# Patient Record
Sex: MALE | Race: WHITE | URBAN - METROPOLITAN AREA
[De-identification: names, ages, dates, MRNs, and addresses within clinical notes are randomized per-mention and may not be internally consistent; named-entity substitution may affect disease eponyms.]

---

## 2018-03-07 ENCOUNTER — OFFICE VISIT - HEALTHEAST (OUTPATIENT)
Dept: FAMILY MEDICINE | Facility: CLINIC | Age: 43
End: 2018-03-07

## 2018-03-07 DIAGNOSIS — I10 ESSENTIAL HYPERTENSION: ICD-10-CM

## 2018-03-07 DIAGNOSIS — R50.9 FEVER: ICD-10-CM

## 2018-03-07 DIAGNOSIS — R05.9 COUGH: ICD-10-CM

## 2018-03-07 LAB
FLUAV AG SPEC QL IA: ABNORMAL
FLUBV AG SPEC QL IA: ABNORMAL

## 2018-03-07 ASSESSMENT — MIFFLIN-ST. JEOR: SCORE: 1853.8

## 2019-02-18 ENCOUNTER — DOCUMENTATION ONLY (OUTPATIENT)
Dept: OTHER | Facility: CLINIC | Age: 44
End: 2019-02-18

## 2019-02-18 ENCOUNTER — AMBULATORY - HEALTHEAST (OUTPATIENT)
Dept: OTHER | Facility: CLINIC | Age: 44
End: 2019-02-18

## 2021-05-24 ENCOUNTER — RECORDS - HEALTHEAST (OUTPATIENT)
Dept: ADMINISTRATIVE | Facility: CLINIC | Age: 46
End: 2021-05-24

## 2021-05-27 ENCOUNTER — RECORDS - HEALTHEAST (OUTPATIENT)
Dept: ADMINISTRATIVE | Facility: CLINIC | Age: 46
End: 2021-05-27

## 2021-05-28 ENCOUNTER — RECORDS - HEALTHEAST (OUTPATIENT)
Dept: ADMINISTRATIVE | Facility: CLINIC | Age: 46
End: 2021-05-28

## 2021-05-30 ENCOUNTER — RECORDS - HEALTHEAST (OUTPATIENT)
Dept: ADMINISTRATIVE | Facility: CLINIC | Age: 46
End: 2021-05-30

## 2021-06-01 ENCOUNTER — RECORDS - HEALTHEAST (OUTPATIENT)
Dept: ADMINISTRATIVE | Facility: CLINIC | Age: 46
End: 2021-06-01

## 2021-06-01 VITALS — BODY MASS INDEX: 23.94 KG/M2 | HEIGHT: 75 IN | WEIGHT: 192.5 LBS

## 2021-06-02 ENCOUNTER — RECORDS - HEALTHEAST (OUTPATIENT)
Dept: ADMINISTRATIVE | Facility: CLINIC | Age: 46
End: 2021-06-02

## 2021-06-16 NOTE — PROGRESS NOTES
"MARGOT Landaverde is a 42 y.o. male here for -  Nose and chest are congested and he's coughing up mucous. Started a week ago, getting a little worse. Having hot and cold flashes, not sure if he's had fevers. Sore throat this morning. Threw up twice 5 days ago but not since. Was coughing so hard he vomited. He was exposed to the flu by a coworker and his boss and wife wanted him to get checked.     Smokes 1/4 ppd. Trying to quit. Has been smoking since age 15.     He has high blood pressure and takes metoprolol XL 50 mg BID but ran out a week ago. Needs a refill.     Past Medical History:   Diagnosis Date     Arthritis of back      HTN (hypertension)      Current Outpatient Prescriptions on File Prior to Visit   Medication Sig Dispense Refill     CYCLOBENZAPRINE HCL (FLEXERIL ORAL) Take by mouth.       HYDROCHLOROTHIAZIDE ORAL Take by mouth.       ibuprofen (ADVIL,MOTRIN) 600 MG tablet Take 600 mg by mouth every 6 (six) hours as needed for pain.       metoprolol (TOPROL-XL) 50 MG 24 hr tablet Take 50 mg by mouth daily.       metoprolol tartrate (LOPRESSOR) 50 MG tablet Take 1 tablet (50 mg total) by mouth 2 (two) times a day. 60 tablet 0     No current facility-administered medications on file prior to visit.        Past medical, surgical, family and social history reviewed and updated in record.   ?  ROS:   General:subjective fevers, hot and cold flashes  See HPI?  O  /82  Pulse (!) 120 Comment: Pt states that his pulse rate is normally between 100-120  Temp 98.1  F (36.7  C) (Oral)   Resp 16  Ht 6' 3\" (1.905 m)  Wt 192 lb 8 oz (87.3 kg)  SpO2 100% Comment: RA  BMI 24.06 kg/m2   Vitals reviewed. Nursing note reviewed.  General Appearance: Pleasant and alert, in no acute distress  HEENT: oropharynx slightly red, without edema or exudate, mucous membranes moist, neck supple, no lymphadenopathy. Poor dentition.   CV: RRR, no murmur, rubs, gallops  Resp: No respiratory distress. Clear to auscultation " bilaterally. No wheezes, rales, rhonchi. Coughing occasionally throughout exam.   Skin: warm, dry, intact, no rash noted  Neuro: no focal deficits, CNs II-XII normal.   A/P  Germán was seen today for chest pain, hearing loss and nasal congestion.    Diagnoses and all orders for this visit:    Essential hypertension  -     metoprolol succinate (TOPROL-XL) 50 MG 24 hr tablet; Take 1 tablet (50 mg total) by mouth 2 (two) times a day.    Cough  -     Influenza A/B Rapid Test- positive for influenza B. He is out of the window for Tamiflu to be effective and I advised lots of rest, hydration, tylenol PRN and honey with tea. Gave letter for work to be out for next 2 days. He is already off for 3 days after that. Regarding his chest pressure, this is likely due to his coughing and influenza, but I advised that if it gets more severe or painful, he should be seen in the ER. He agrees.     Fever  -     acetaminophen (TYLENOL EXTRA STRENGTH) 500 MG tablet; Take 2 tablets (1,000 mg total) by mouth every 6 (six) hours as needed for pain.     Options for treatment and follow-up care were reviewed with the patient and/or guardian. Germán Landaverde and/or guardian engaged in the decision making process and verbalized understanding of the options discussed and agreed with the final plan.    Erica Ellison MD